# Patient Record
Sex: FEMALE | Race: BLACK OR AFRICAN AMERICAN
[De-identification: names, ages, dates, MRNs, and addresses within clinical notes are randomized per-mention and may not be internally consistent; named-entity substitution may affect disease eponyms.]

---

## 2019-07-27 ENCOUNTER — HOSPITAL ENCOUNTER (EMERGENCY)
Dept: HOSPITAL 72 - EMR | Age: 39
Discharge: HOME | End: 2019-07-27
Payer: MEDICAID

## 2019-07-27 VITALS — HEIGHT: 66 IN | BODY MASS INDEX: 23.3 KG/M2 | WEIGHT: 145 LBS

## 2019-07-27 VITALS — DIASTOLIC BLOOD PRESSURE: 69 MMHG | SYSTOLIC BLOOD PRESSURE: 121 MMHG

## 2019-07-27 VITALS — SYSTOLIC BLOOD PRESSURE: 121 MMHG | DIASTOLIC BLOOD PRESSURE: 69 MMHG

## 2019-07-27 DIAGNOSIS — S60.229A: ICD-10-CM
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
## 2019-07-27 DIAGNOSIS — M25.562: Primary | ICD-10-CM

## 2019-07-27 DIAGNOSIS — S80.00XA: ICD-10-CM
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
## 2019-07-27 DIAGNOSIS — M25.561: ICD-10-CM

## 2019-07-27 DIAGNOSIS — T14.90XA: ICD-10-CM

## 2019-07-27 PROCEDURE — 99284 EMERGENCY DEPT VISIT MOD MDM: CPT
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
## 2019-07-27 NOTE — DIAGNOSTIC IMAGING REPORT
EXAM:

  XR Right Hand Complete, 3 or More Views

 

CLINICAL HISTORY:

  TRAUMA

 

TECHNIQUE:

  Frontal, lateral and oblique views of the right hand.

 

COMPARISON:

  None

 

FINDINGS:

  Bones/joints: No displaced fracture or dislocation identified.  

Osteopenia

 

  Soft tissues: Normal.

 

IMPRESSION:   

  No fracture or dislocation identified.

## 2019-07-27 NOTE — NUR
ER DISCHARGE NOTE:

Patient is cleared to be discharged per ERMD with family member, pt is aox4, on 
room air, with stable vital signs. pt was given dc and prescription 
instructions, pt was able to verbalize understanding, pt id band removed 
without complications. pt is able to ambulate with steady gait. pt took all 
belongings.
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
## 2019-07-27 NOTE — DIAGNOSTIC IMAGING REPORT
EXAM:

  XR Left Knee, 3 views

 

CLINICAL HISTORY:

  TRAUMA

 

TECHNIQUE:

  Three views of the left knee.

 

COMPARISON:

  None

 

FINDINGS:

  Bones/joints: No displaced fracture or dislocation identified.  

Osteopenia.  No joint effusion.

 

  Soft tissues: Normal.

 

IMPRESSION:   

  No displaced fracture or dislocation identified.

## 2020-01-07 ENCOUNTER — HOSPITAL ENCOUNTER (EMERGENCY)
Dept: HOSPITAL 72 - EMR | Age: 40
Discharge: HOME | End: 2020-01-07
Payer: MEDICAID

## 2020-01-07 VITALS — WEIGHT: 135 LBS | HEIGHT: 66 IN | BODY MASS INDEX: 21.69 KG/M2

## 2020-01-07 VITALS — SYSTOLIC BLOOD PRESSURE: 105 MMHG | DIASTOLIC BLOOD PRESSURE: 76 MMHG

## 2020-01-07 VITALS — SYSTOLIC BLOOD PRESSURE: 121 MMHG | DIASTOLIC BLOOD PRESSURE: 89 MMHG

## 2020-01-07 DIAGNOSIS — R07.9: Primary | ICD-10-CM

## 2020-01-07 DIAGNOSIS — K52.9: ICD-10-CM

## 2020-01-07 LAB
ADD MANUAL DIFF: NO
ALBUMIN SERPL-MCNC: 4 G/DL (ref 3.4–5)
ALBUMIN/GLOB SERPL: 1.2 {RATIO} (ref 1–2.7)
ALP SERPL-CCNC: 60 U/L (ref 46–116)
ALT SERPL-CCNC: 19 U/L (ref 12–78)
ANION GAP SERPL CALC-SCNC: 8 MMOL/L (ref 5–15)
APPEARANCE UR: CLEAR
APTT PPP: (no result) S
AST SERPL-CCNC: 13 U/L (ref 15–37)
BASOPHILS NFR BLD AUTO: 1.2 % (ref 0–2)
BILIRUB SERPL-MCNC: 0.2 MG/DL (ref 0.2–1)
BUN SERPL-MCNC: 11 MG/DL (ref 7–18)
CALCIUM SERPL-MCNC: 8.6 MG/DL (ref 8.5–10.1)
CHLORIDE SERPL-SCNC: 105 MMOL/L (ref 98–107)
CO2 SERPL-SCNC: 28 MMOL/L (ref 21–32)
CREAT SERPL-MCNC: 0.9 MG/DL (ref 0.55–1.3)
EOSINOPHIL NFR BLD AUTO: 4.1 % (ref 0–3)
ERYTHROCYTE [DISTWIDTH] IN BLOOD BY AUTOMATED COUNT: 11.6 % (ref 11.6–14.8)
GLOBULIN SER-MCNC: 3.4 G/DL
GLUCOSE UR STRIP-MCNC: NEGATIVE MG/DL
HCT VFR BLD CALC: 41 % (ref 37–47)
HGB BLD-MCNC: 13.7 G/DL (ref 12–16)
KETONES UR QL STRIP: NEGATIVE
LEUKOCYTE ESTERASE UR QL STRIP: (no result)
LYMPHOCYTES NFR BLD AUTO: 31.2 % (ref 20–45)
MCV RBC AUTO: 105 FL (ref 80–99)
MONOCYTES NFR BLD AUTO: 6.3 % (ref 1–10)
NEUTROPHILS NFR BLD AUTO: 57.1 % (ref 45–75)
NITRITE UR QL STRIP: NEGATIVE
PH UR STRIP: 8 [PH] (ref 4.5–8)
PLATELET # BLD: 343 K/UL (ref 150–450)
POTASSIUM SERPL-SCNC: 3.5 MMOL/L (ref 3.5–5.1)
PROT UR QL STRIP: NEGATIVE
RBC # BLD AUTO: 3.88 M/UL (ref 4.2–5.4)
SODIUM SERPL-SCNC: 141 MMOL/L (ref 136–145)
SP GR UR STRIP: 1.01 (ref 1–1.03)
UROBILINOGEN UR-MCNC: NORMAL MG/DL (ref 0–1)
WBC # BLD AUTO: 14.4 K/UL (ref 4.8–10.8)

## 2020-01-07 PROCEDURE — 80053 COMPREHEN METABOLIC PANEL: CPT

## 2020-01-07 PROCEDURE — 71045 X-RAY EXAM CHEST 1 VIEW: CPT

## 2020-01-07 PROCEDURE — 36415 COLL VENOUS BLD VENIPUNCTURE: CPT

## 2020-01-07 PROCEDURE — 85025 COMPLETE CBC W/AUTO DIFF WBC: CPT

## 2020-01-07 PROCEDURE — 96361 HYDRATE IV INFUSION ADD-ON: CPT

## 2020-01-07 PROCEDURE — 74176 CT ABD & PELVIS W/O CONTRAST: CPT

## 2020-01-07 PROCEDURE — 96374 THER/PROPH/DIAG INJ IV PUSH: CPT

## 2020-01-07 PROCEDURE — 81003 URINALYSIS AUTO W/O SCOPE: CPT

## 2020-01-07 PROCEDURE — 81025 URINE PREGNANCY TEST: CPT

## 2020-01-07 PROCEDURE — 99284 EMERGENCY DEPT VISIT MOD MDM: CPT

## 2020-01-07 PROCEDURE — 83690 ASSAY OF LIPASE: CPT

## 2020-01-07 PROCEDURE — 80307 DRUG TEST PRSMV CHEM ANLYZR: CPT

## 2020-01-07 NOTE — DIAGNOSTIC IMAGING REPORT
Indication: Shortness of breath

 

Technique: One view of the chest

 

Comparison: 12/8/2009

 

Findings: Lungs and pleural spaces are clear. Heart size is normal.

 

Impression: No acute process

## 2020-01-07 NOTE — DIAGNOSTIC IMAGING REPORT
Indication: Abdominal pain

 

Technique: Spiral acquisitions obtained through the abdomen and pelvis. No oral

contrast utilized, per emergency room physician request No IV contrast utilized,  per

emergency room physician request.. Multiplanar reconstructions were generated. Total

dose length product 753 mGycm. CTDIvol(s) 13 mGy. Dose reduction achieved using

automated exposure control

 

 

Comparison: None

 

Findings: There is suggestion of slight wall thickening of the descending colon and

infiltration of the pericolonic fat. No evidence of colonic diverticulosis or

diverticulitis. Normal appendix. No small bowel distention. There is trace free fluid

within the pelvic cul-de-sac. No free intraperitoneal gas.. Distal esophagus,

stomach, duodenum are unremarkable.

 

The gallbladder is nondistended. The lack of IV contrast limits assessment of the

solid organs. The liver, pancreas, spleen, adrenals, kidneys are all unremarkable.

Venous varicosities are seen in the retroperitoneum inferior to the left renal lower

pole. No pelvic mass or adenopathy. Retroverted uterus.

 

Included lung bases are clear. The bones are unremarkable

 

Impression: Thick-walled descending colon with pericolonic fat stranding, consistent

with colitis, nonspecific as regards etiology

 

Trace free pelvic fluid, could be related to the above or could be physiologic

 

Unusual venous varicosities in the left inferior retroperitoneum. Significance

uncertain

 

This essentially agrees with the preliminary interpretation provided overnight by

[facility] teleradiology service.

 

The CT scanner at [facility] is accredited by the [employer] College of

Radiology and the scans are performed using protocols designed to limit radiation

exposure to as low as reasonably achievable to attain images of sufficient resolution

adequate for diagnostic evaluation.

## 2020-01-07 NOTE — EMERGENCY ROOM REPORT
History of Present Illness


General


Chief Complaint:  Gastrointestinal Bleed


Source:  Patient





Present Illness


HPI


Is a 39-year-old female with no past medical history.  She presents with chief 

complaint of left-sided chest pain and abdominal pain.  Onset for 1 to 2 days.  

Pain on her chest is sharp in nature.  No rash.  No shortness of breath.  No 

fever chills.  Pain is abdomen is diffuse in nature.  She felt bloated.  No 

fever chills but she says she has some rectal bleeding is bright red blood.  

Never had this problem before.  No family history of inflammatory bowel 

disease.  It is 7 out of 10.


Allergies:  


Coded Allergies:  


     No Known Allergies (Unverified , 4/10/13)





Patient History


Past Medical History:  see triage record, old chart reviewed


Past Surgical History:  none


Pertinent Family History:  none


Social History:  Denies: smoking


Pregnant Now:  No


Immunizations:  other


Reviewed Nursing Documentation:  PMH: Agreed; PSxH: Agreed





Nursing Documentation-PMH


Past Medical History:  No History, Except For





Review of Systems


Eye:  Denies: eye pain, blurred vision


ENT:  Denies: ear pain, nose congestion, throat swelling


Respiratory:  Denies: cough, shortness of breath


Cardiovascular:  Reports: chest pain; Denies: palpitations


Gastrointestinal:  Reports: abdominal pain; Denies: diarrhea, nausea, vomiting


Musculoskeletal:  Denies: back pain, joint pain


Skin:  Denies: rash


Neurological:  Denies: headache, numbness


Endocrine:  Denies: increased thirst, increased urine


Hematologic/Lymphatic:  Denies: easy bruising


All Other Systems:  negative except mentioned in HPI





Physical Exam





Vital Signs








  Date Time  Temp Pulse Resp B/P (MAP) Pulse Ox O2 Delivery O2 Flow Rate FiO2


 


1/7/20 02:20 98.2 95 12 121/89 (100) 98 Room Air  





Vitals normal


Sp02 EP Interpretation:  reviewed, normal


General Appearance:  well appearing, no apparent distress, alert


Head:  normocephalic, atraumatic


Eyes:  bilateral eye PERRL, bilateral eye EOMI


ENT:  hearing grossly normal, normal pharynx


Neck:  full range of motion, supple, no meningismus


Respiratory:  chest non-tender, lungs clear, normal breath sounds


Cardiovascular #1:  regular rate, rhythm, no murmur


Gastrointestinal:  normal bowel sounds, no mass, no organomegaly, no bruit, non-

distended, tenderness - Mild diffuse, other - No hemorrhoids.  Heme-negative


Musculoskeletal:  back normal, normal range of motion, gait/station normal


Psychiatric:  mood/affect normal





Medical Decision Making


Diagnostic Impression:  


 Primary Impression:  


 Chest pain


 Qualified Codes:  R07.9 - Chest pain, unspecified


 Additional Impression:  


 Colitis


ER Course


This patient presents with atypical chest pain.  No evidence of ACS, PE, 

dissection to name a few.  Abdominal CT show colitis of the distal transverse 

and descending colon.  This may be inflammatory infectious.  We will put her on 

antibiotics.  She may need a colonoscopy.  She denies any family history of 

inflammatory bowel disease.  But she does not know if any member has any.


Rhythm Strip Diag. Results


EP Interpretation:  yes


Rate:  77


Rhythm:  NSR, no PVC's, no ectopy





Chest X-Ray Diagnostic Results


Chest X-Ray Diagnostic Results :  


   Chest X-Ray Ordered:  Yes


   # of Views/Limited/Complete:  1 View


   Indication:  Chest Pain


   EP Interpretation:  Yes


   Interpretation:  no consolidation, no effusion, no pneumothorax, no acute 

cardiopulmonary disease


   Impression:  No acute disease


   Electronically Signed by:  Chaitanya Bee MD





CT/MRI/US Diagnostic Results


CT/MRI/US Diagnostic Results :  


   Imaging Test Ordered:  CT abdomen pelvis


   Impression


Read by radiologist.  Thickening of the distal transverse and descending colon.





Last Vital Signs








  Date Time  Temp Pulse Resp B/P (MAP) Pulse Ox O2 Delivery O2 Flow Rate FiO2


 


1/7/20 04:13 98.2       


 


1/7/20 03:00  95 12 121/89 98 Room Air  








Status:  improved


Disposition:  HOME, SELF-CARE


Condition:  Stable


Scripts


Ibuprofen* (MOTRIN*) 600 Mg Tablet


600 MG ORAL THREE TIMES A DAY, #30 TAB 0 Refills


   Prov: Chaitanya Bee MD         1/7/20 


Metronidazole* (FLAGYL*) 500 Mg Tablet


500 MG ORAL BID, #14 TAB


   Prov: Chaitanya Bee MD         1/7/20 


Ciprofloxacin Hcl* (CIPROFLOXACIN HCL*) 500 Mg Tablet


500 MG ORAL Q12H, #14 TAB 0 Refills


   Prov: Chaitanya Bee MD         1/7/20


Referrals:  


[facility],REFERRING (PCP)





Additional Instructions:  


Follow-up with your doctor in 7 days.  You may need a referral to see a GI 

doctor for colonoscopy if symptoms do not improve.  This is a check for 

inflammatory bowel disease like Crohn's disease or ulcerative colitis.  Return 

if symptoms worsen.











Chaitanya Bee MD Jan 7, 2020 05:37